# Patient Record
Sex: MALE | Race: WHITE | Employment: FULL TIME | URBAN - NONMETROPOLITAN AREA
[De-identification: names, ages, dates, MRNs, and addresses within clinical notes are randomized per-mention and may not be internally consistent; named-entity substitution may affect disease eponyms.]

---

## 2022-10-12 ENCOUNTER — INITIAL CONSULT (OUTPATIENT)
Dept: PULMONOLOGY | Age: 57
End: 2022-10-12
Payer: COMMERCIAL

## 2022-10-12 VITALS
DIASTOLIC BLOOD PRESSURE: 76 MMHG | TEMPERATURE: 97.6 F | WEIGHT: 232 LBS | BODY MASS INDEX: 38.65 KG/M2 | HEART RATE: 66 BPM | HEIGHT: 65 IN | SYSTOLIC BLOOD PRESSURE: 132 MMHG | OXYGEN SATURATION: 96 %

## 2022-10-12 DIAGNOSIS — G47.33 OSA ON CPAP: Primary | ICD-10-CM

## 2022-10-12 DIAGNOSIS — E66.9 OBESITY (BMI 30-39.9): ICD-10-CM

## 2022-10-12 DIAGNOSIS — Z99.89 OSA ON CPAP: Primary | ICD-10-CM

## 2022-10-12 PROCEDURE — 99202 OFFICE O/P NEW SF 15 MIN: CPT | Performed by: NURSE PRACTITIONER

## 2022-10-12 ASSESSMENT — ENCOUNTER SYMPTOMS
COUGH: 0
WHEEZING: 0
DIARRHEA: 0
NAUSEA: 0
ABDOMINAL PAIN: 0
VOMITING: 0
SHORTNESS OF BREATH: 0
EYES NEGATIVE: 1

## 2022-10-12 NOTE — PROGRESS NOTES
[facility]    Patient: Austin Velez. Gina Villalobos., 56 y.o.   : 1965  10/12/2022       New sleep consult    Subjective     Chief Complaint   Patient presents with    New Patient     New sleep consult, already on PAP        HPI  Rupali Randhawa is here for evaluation of his Sleep Apnea  sleep apnea diagnosed about 12 years ago . Patient was self-referred. He is an employee at [employer]  Initial PSG was ordered by his family doctor's Dr Maria Del Rosario Velarde MD , was then prescribed CPAP and has been using a CPAP since. He has had a new machine since the initial sleep study was completed. He believes his last machine was issued in . Currently does not feel like it is blowing out as much air as it had before. Wife has been noticing some breakthrough snoring. He still is overall getting much benefit from the use of the CPAP machine. He reports he can tell a difference between just staying up late and not getting enough sleep versus when his sleep apnea was not well controlled. Symptoms include snoring. Rupali Randhawa does have improvement in symptoms. There has been a 24 pound weight gain over the last 10 years.     Sleep Hx     Sleep symptoms:  Yes No  Additional Comments   Snoring [x] [] Wife notices breakthrough snoring lately    Witness Apneas [] [x]        Waking snorting/gasping [] [x]     Nocturia []    [x]     Legs kicking at night [] [x]     AM Headaches [] [x]     Non-restorative sleep [] [x]     Daytime sleepiness/fatigue [] [x]     Daytime napping [] [x]     Drowsiness while driving [] [x]     Memory issues [] [x]     Decreased concentration [] [x]     Cataplexy [] [x]     Hypnogogic hallucinations [] [x]     Sleep paralysis [] [x]     Other [] [x]          Previous evaluation and treatment has included-PSG with C PAP titration      DOT/CDL - no  FAA/'s license - no     Previous Report(s) Reviewed: historical medical records, office notes and referral letter/letters      [place] -  6  SAQLI - 89    Download     PAP Download:   Original or initial  AHI: 48.2     Date of initial study: 1/4/2006   Weight at time of initial study 208 pounds   [x] Compliant  99% []Noncompliant 0 %     PAP Type CPAP    Level  12   Avg Hrs/Day 8   Recorded compliance dates : 7/14/22-10/11/22  Total Hours: 700  Machine/Mfg: ResMed Interface: Nasal    Provider:  [][facility]  [][facility]  [][facility]  [x][facility]         [][facility] []Other:   Neck Size: 19.25  Mallampati 4  ESS:  6  SAQLI: 89    Past Medical hx     PMH:History reviewed. No pertinent past medical history. SURGICAL HISTORY:History reviewed. No pertinent surgical history. SOCIAL HISTORY:  Social History     Tobacco Use    Smoking status: Never    Smokeless tobacco: Never   Substance Use Topics    Alcohol use: Not Currently    Drug use: Never     ALLERGIES:No Known Allergies  FAMILY HISTORY:History reviewed. No pertinent family history. CURRENT MEDICATIONS:  Current Outpatient Medications   Medication Sig Dispense Refill    CPAP Machine MISC by Does not apply route Please change CPAP pressure to 13 cm H20. 1 each 0     No current facility-administered medications for this visit. Chela Segura ROS   Review of Systems   Constitutional:  Negative for activity change, appetite change, chills, fatigue, fever and unexpected weight change. HENT: Negative. Eyes: Negative. Respiratory:  Negative for cough, shortness of breath and wheezing. Occasional snoring    Cardiovascular:  Negative for chest pain, palpitations and leg swelling. Gastrointestinal:  Negative for abdominal pain, diarrhea, nausea and vomiting. Genitourinary: Negative. Musculoskeletal: Negative. Skin: Negative. Allergic/Immunologic: Positive for environmental allergies. Neurological: Negative. Hematological: Negative. Psychiatric/Behavioral: Negative. Negative for sleep disturbance.        Physical exam   /76 (Site: Right Upper Arm, Position: Sitting, Cuff Size: Medium Adult)   Pulse 66   Temp 97.6 °F (36.4 °C) (Skin)   Ht 5' 5\" (1.651 m)   Wt 232 lb (105.2 kg)   SpO2 96%   BMI 38.61 kg/m²      Physical Exam  Vitals and nursing note reviewed. Constitutional:       Appearance: Normal appearance. He is obese. HENT:      Head: Normocephalic and atraumatic. Mouth/Throat:      Pharynx: Oropharynx is clear. Eyes:      Conjunctiva/sclera: Conjunctivae normal.   Pulmonary:      Effort: Pulmonary effort is normal. No tachypnea, bradypnea or respiratory distress. Skin:     Findings: No erythema or rash. Neurological:      Mental Status: He is alert and oriented to person, place, and time. Psychiatric:         Attention and Perception: Attention normal.         Mood and Affect: Mood normal.         Speech: Speech normal.         Behavior: Behavior normal.         Thought Content: Thought content normal.         Cognition and Memory: Cognition normal.         Judgment: Judgment normal.        Sleep Study     Assessment      Diagnosis Orders   1. JOSEFINA on CPAP  DME Order for CPAP as OP    CPAP Machine MISC      2. Obesity (BMI 30-39. 9)            Plan   -- Download reviewed and discussed with patient, AHI controlled at 4.4 , however with the weight gain and complains of breakthrough snoring recommend increasing pressures. Currently at 12cwp plan to increase to 13cwp  Orders were printed and faxed to [facility]  -Patient's machine is currently greater than 11years old and he is complaining that it is not operating as well as it used to. An Rx for DME to supply new CPAP machine with a pressure of 13cwp  -Currently using a nasal mask. His download is showing high leaks on most nights. This may be positional versus opening his mouth. We discussed trying a chinstrap he is not interested.   Current leaks do not interrupt his sleep    - He  advised to keep good compliance with current recommended pressure to get optimal results and

## 2022-10-28 ENCOUNTER — TELEPHONE (OUTPATIENT)
Dept: PULMONOLOGY | Age: 57
End: 2022-10-28

## 2022-10-28 DIAGNOSIS — G47.33 OSA ON CPAP: Primary | ICD-10-CM

## 2022-10-28 DIAGNOSIS — Z99.89 OSA ON CPAP: Primary | ICD-10-CM

## 2022-10-28 DIAGNOSIS — R06.83 SNORING: ICD-10-CM

## 2022-10-28 NOTE — TELEPHONE ENCOUNTER
Ruth Gaytan is unsure of which pressure change on the cpap order they shuld go through with. Please advise.

## 2022-11-08 ENCOUNTER — TELEPHONE (OUTPATIENT)
Dept: PULMONOLOGY | Age: 57
End: 2022-11-08

## 2022-11-08 NOTE — TELEPHONE ENCOUNTER
Milena Rollins from [place] never got corrected CPAP order. I see where it was faxed to Sujit Mcwilliams by mistake so I faxed order, consult note and PSG to Herbert.

## 2023-10-25 ENCOUNTER — OFFICE VISIT (OUTPATIENT)
Dept: PULMONOLOGY | Age: 58
End: 2023-10-25
Payer: COMMERCIAL

## 2023-10-25 VITALS
WEIGHT: 222 LBS | DIASTOLIC BLOOD PRESSURE: 64 MMHG | HEART RATE: 62 BPM | BODY MASS INDEX: 36.99 KG/M2 | TEMPERATURE: 97.8 F | SYSTOLIC BLOOD PRESSURE: 130 MMHG | OXYGEN SATURATION: 97 % | HEIGHT: 65 IN

## 2023-10-25 DIAGNOSIS — E66.9 OBESITY (BMI 30-39.9): ICD-10-CM

## 2023-10-25 DIAGNOSIS — G47.33 OSA ON CPAP: Primary | ICD-10-CM

## 2023-10-25 PROCEDURE — 99214 OFFICE O/P EST MOD 30 MIN: CPT | Performed by: NURSE PRACTITIONER

## 2023-10-25 ASSESSMENT — ENCOUNTER SYMPTOMS: RESPIRATORY NEGATIVE: 1
